# Patient Record
Sex: FEMALE | Race: OTHER | NOT HISPANIC OR LATINO
[De-identification: names, ages, dates, MRNs, and addresses within clinical notes are randomized per-mention and may not be internally consistent; named-entity substitution may affect disease eponyms.]

---

## 2022-12-12 ENCOUNTER — NON-APPOINTMENT (OUTPATIENT)
Age: 27
End: 2022-12-12

## 2022-12-13 ENCOUNTER — APPOINTMENT (OUTPATIENT)
Dept: CARDIOLOGY | Facility: CLINIC | Age: 27
End: 2022-12-13
Payer: MEDICARE

## 2022-12-13 ENCOUNTER — NON-APPOINTMENT (OUTPATIENT)
Age: 27
End: 2022-12-13

## 2022-12-13 VITALS
BODY MASS INDEX: 27.19 KG/M2 | WEIGHT: 144 LBS | TEMPERATURE: 97.6 F | HEART RATE: 85 BPM | HEIGHT: 61 IN | OXYGEN SATURATION: 98 % | SYSTOLIC BLOOD PRESSURE: 110 MMHG | DIASTOLIC BLOOD PRESSURE: 60 MMHG

## 2022-12-13 PROBLEM — Z00.00 ENCOUNTER FOR PREVENTIVE HEALTH EXAMINATION: Status: ACTIVE | Noted: 2022-12-13

## 2022-12-13 PROCEDURE — 93000 ELECTROCARDIOGRAM COMPLETE: CPT

## 2022-12-13 PROCEDURE — 93306 TTE W/DOPPLER COMPLETE: CPT

## 2022-12-13 PROCEDURE — 99204 OFFICE O/P NEW MOD 45 MIN: CPT | Mod: 25

## 2022-12-13 RX ORDER — FLUOXETINE HYDROCHLORIDE 10 MG/1
10 CAPSULE ORAL DAILY
Refills: 0 | Status: ACTIVE | COMMUNITY

## 2022-12-13 NOTE — DISCUSSION/SUMMARY
[FreeTextEntry1] : The patient comes for evaluation of symptoms of pleuritic type of chest discomfort.  I believe the symptoms are very strongly suggestive of acute pericarditis.  She had had a viral syndrome several days before the symptoms occurred.  Has been slight improvement however the symptoms are quite persistent.  Cardiorespiratory examination is normal the electrocardiogram is normal and I performed an echocardiogram which was normal except for trivial pericardial effusion\par \par I recommended that the patient begin treatment with an anti-inflammatory agent I suggested Aleve twice a day for several days.\par \par She will keep me advised of her progress.  Patient suffers also from daily migraine headaches and I thought that perhaps relaxation/hypnotherapy might be beneficial for her.

## 2022-12-13 NOTE — HISTORY OF PRESENT ILLNESS
[FreeTextEntry1] : The patient has longstanding asthma.  She was treated for depression in the past and this has significantly improved.

## 2022-12-13 NOTE — REASON FOR VISIT
[FreeTextEntry1] : Patient comes for initial office evaluation.  She was well until 4 days ago when she developed severe pleuritic type chest pain which she felt mainly in the left upper chest also felt in the left shoulder exacerbated by lying flat and alleviated by sitting up.  The symptom was quite severe and eventually she went to the ER at [facility] for full evaluation.\par The patient was discharged from the hospital with a recommendation to follow-up with her primary and with a cardiologist.  This time what the patient is telling me they did recommend an anti-inflammatory suggesting a diagnosis of pericarditis.

## 2022-12-13 NOTE — REVIEW OF SYSTEMS
[Negative] : Heme/Lymph [FreeTextEntry6] : Patient has a long history of asthma and takes Pulmicort daily and albuterol as needed. [de-identified] : Patient suffers from very frequent migraine headaches.

## 2022-12-20 ENCOUNTER — NON-APPOINTMENT (OUTPATIENT)
Age: 27
End: 2022-12-20

## 2022-12-20 ENCOUNTER — APPOINTMENT (OUTPATIENT)
Dept: CARDIOLOGY | Facility: CLINIC | Age: 27
End: 2022-12-20
Payer: MEDICARE

## 2022-12-20 VITALS
HEART RATE: 78 BPM | WEIGHT: 145 LBS | DIASTOLIC BLOOD PRESSURE: 70 MMHG | OXYGEN SATURATION: 90 % | HEIGHT: 61 IN | SYSTOLIC BLOOD PRESSURE: 130 MMHG | BODY MASS INDEX: 27.38 KG/M2

## 2022-12-20 DIAGNOSIS — R07.89 OTHER CHEST PAIN: ICD-10-CM

## 2022-12-20 DIAGNOSIS — I31.9 DISEASE OF PERICARDIUM, UNSPECIFIED: ICD-10-CM

## 2022-12-20 PROCEDURE — 99213 OFFICE O/P EST LOW 20 MIN: CPT | Mod: 25

## 2022-12-20 PROCEDURE — 93000 ELECTROCARDIOGRAM COMPLETE: CPT

## 2022-12-20 NOTE — DISCUSSION/SUMMARY
[FreeTextEntry1] : I believe the patient's general clinical condition has improved although her symptoms have not completely resolved.  Her exam today reveals clear lung fields normal heart sounds without murmurs or rubs\par \par The electrocardiogram today is normal\par \par I discussed my findings with the patient and her mother who was present.  I believe the diagnosis is mild pericarditis secondary to her recent viral infection.  I suggested that she continue her nonsteroidal anti-inflammatory treatment at the present time to be taken with meals.  I ordered a routine chest x-ray.\par \par Asked the patient to call me in 1 to 2 weeks with an update on her condition.  I also asked her to notify her primary care physician.\par \par

## 2022-12-20 NOTE — REASON FOR VISIT
[FreeTextEntry1] : The patient returns for a follow-up visit I had seen her earlier this month with symptoms highly suggestive of acute pericarditis.  The patient had pleuritic chest pain aggravated by lying flat and relieved by sitting forward.  The echocardiogram revealed no significant pericardial effusion and EKGs were normal\par \par The patient was advised to take a nonsteroidal anti-inflammatory and report back.\par \par There has been improvement in her clinical condition although the symptoms have not entirely resolved\par \par \par \par

## 2022-12-22 ENCOUNTER — RESULT REVIEW (OUTPATIENT)
Age: 27
End: 2022-12-22